# Patient Record
Sex: FEMALE | Race: WHITE
[De-identification: names, ages, dates, MRNs, and addresses within clinical notes are randomized per-mention and may not be internally consistent; named-entity substitution may affect disease eponyms.]

---

## 2019-04-29 ENCOUNTER — HOSPITAL ENCOUNTER (INPATIENT)
Dept: HOSPITAL 92 - L&D/OP | Age: 24
LOS: 1 days | Discharge: HOME | End: 2019-04-30
Attending: OBSTETRICS & GYNECOLOGY | Admitting: OBSTETRICS & GYNECOLOGY
Payer: COMMERCIAL

## 2019-04-29 VITALS — BODY MASS INDEX: 31.4 KG/M2

## 2019-04-29 DIAGNOSIS — Z3A.39: ICD-10-CM

## 2019-04-29 LAB
HBSAG INDEX: 0.28 S/CO (ref 0–0.99)
HGB BLD-MCNC: 12.9 G/DL (ref 12–16)
MCH RBC QN AUTO: 29.7 PG (ref 27–31)
MCV RBC AUTO: 86.4 FL (ref 78–98)
PLATELET # BLD AUTO: 214 THOU/UL (ref 130–400)
RBC # BLD AUTO: 4.35 MILL/UL (ref 4.2–5.4)
SYPHILIS ANTIBODY INDEX: 0.03 S/CO
WBC # BLD AUTO: 10 THOU/UL (ref 4.8–10.8)

## 2019-04-29 PROCEDURE — 51702 INSERT TEMP BLADDER CATH: CPT

## 2019-04-29 PROCEDURE — 99285 EMERGENCY DEPT VISIT HI MDM: CPT

## 2019-04-29 PROCEDURE — 86900 BLOOD TYPING SEROLOGIC ABO: CPT

## 2019-04-29 PROCEDURE — 36415 COLL VENOUS BLD VENIPUNCTURE: CPT

## 2019-04-29 PROCEDURE — 86850 RBC ANTIBODY SCREEN: CPT

## 2019-04-29 PROCEDURE — 87340 HEPATITIS B SURFACE AG IA: CPT

## 2019-04-29 PROCEDURE — 85027 COMPLETE CBC AUTOMATED: CPT

## 2019-04-29 PROCEDURE — 86780 TREPONEMA PALLIDUM: CPT

## 2019-04-29 PROCEDURE — 10907ZC DRAINAGE OF AMNIOTIC FLUID, THERAPEUTIC FROM PRODUCTS OF CONCEPTION, VIA NATURAL OR ARTIFICIAL OPENING: ICD-10-PCS | Performed by: OBSTETRICS & GYNECOLOGY

## 2019-04-29 PROCEDURE — 86901 BLOOD TYPING SEROLOGIC RH(D): CPT

## 2019-04-29 RX ADMIN — DOCUSATE CALCIUM SCH MG: 240 CAPSULE, LIQUID FILLED ORAL at 21:37

## 2019-04-29 RX ADMIN — Medication SCH: at 21:36

## 2019-04-29 NOTE — PDOC.LDHP
Labor and Delivery H&P


Chief complaint: contractions


HPI: 


22 y/o  at 39 weeks and 2/7 days presents at 7cm this morning in labor.





Current gestational age (weeks): 39


Due date: 19


Grav: 2


Para: 1


Current pregnancy complications: none


Abnormal US findings: No


Current medications: pre-petra vitamins


Previous surgical history: none


Allergies/Adverse Reactions: 


 Allergies











Allergy/AdvReac Type Severity Reaction Status Date / Time


 


No Known Allergies Allergy   Unverified 19 07:16











Social history: none





- Physical Exam


Vital signs reviewed and normal: yes


General: NAD, resting


Heart: RRR


Lungs: nonlabored breathing


Abdomen: NTTP


Extremeties: no edema


FHT: category 1





- Assessment


L&D Assessment: term patient in labor





- Plan


Plan: admit to L&D, labor augmentation if indicated

## 2019-04-30 VITALS — TEMPERATURE: 98 F | SYSTOLIC BLOOD PRESSURE: 121 MMHG | DIASTOLIC BLOOD PRESSURE: 80 MMHG

## 2019-04-30 LAB
HGB BLD-MCNC: 10.9 G/DL (ref 12–16)
MCH RBC QN AUTO: 30.4 PG (ref 27–31)
MCV RBC AUTO: 89.7 FL (ref 78–98)
PLATELET # BLD AUTO: 165 THOU/UL (ref 130–400)
RBC # BLD AUTO: 3.57 MILL/UL (ref 4.2–5.4)
WBC # BLD AUTO: 12.2 THOU/UL (ref 4.8–10.8)

## 2019-04-30 RX ADMIN — Medication SCH: at 09:02

## 2019-04-30 RX ADMIN — DOCUSATE CALCIUM SCH MG: 240 CAPSULE, LIQUID FILLED ORAL at 09:02

## 2019-04-30 NOTE — PDOC.PP
Post Partum Progress Note


Post Partum Day #: 1


PO intake tolerated: yes


Flatus: yes


Ambulation: yes


 Vital Signs (12 hours)











  Temp Pulse Resp BP Pulse Ox


 


 04/30/19 08:00  98.0 F  82  20  121/80  97


 


 04/30/19 05:15  98.9 F  80  16  129/60 


 


 04/29/19 23:45  98.8 F  86  16  116/71 








 Weight











Weight                         183 lb

















- Physical Examination


General: NAD


Cardiovascular: no m/r/g, RRR


Respiratory: clear to auscultation bilaterally, non-labored breathing


Abdominal: + bowel sounds, lochia, no distention


Extremities: negative homans (B)


Neurological: no gross focal deficits


Psychiatric: A&Ox3, normal affect


Result Diagrams: 


 04/30/19 06:22





Additional Labs: 


 Post Partum Labs











Blood Type  A POSITIVE   04/29/19  08:33    


 


Hep Bs Antigen  Non-Reactive S/CO (NonReactive)   04/29/19  07:19